# Patient Record
(demographics unavailable — no encounter records)

---

## 2025-06-19 NOTE — REVIEW OF SYSTEMS
[de-identified] : as per HPI  [de-identified] : as per HPI  [de-identified] : as per HPI  [FreeTextEntry2] : as per HPI

## 2025-06-19 NOTE — ADDENDUM
[FreeTextEntry1] :   Documented by Christopher Howard acting as scribe for Dr. Beckman on 06/19/2025. All Medical record entries made by the Scribe were at my, Dr. Beckman, direction and personally dictated by me on 06/19/2025 . I have reviewed the chart and agree that the record accurately reflects my personal performance of the history, physical exam, assessment and plan. I have also personally directed, reviewed, and agreed with the discharge instructions.

## 2025-06-19 NOTE — CONSULT LETTER
[Dear  ___] : Dear  [unfilled], [Courtesy Letter:] : I had the pleasure of seeing your patient, [unfilled], in my office today. [Please see my note below.] : Please see my note below. [Consult Closing:] : Thank you very much for allowing me to participate in the care of this patient.  If you have any questions, please do not hesitate to contact me. [Sincerely,] : Sincerely, [FreeTextEntry2] : Dr. Connie Salvador [id number] [place] [address] [phone] [FreeTextEntry3] : Reynaldo Beckman MD, PhD Chief, Division of Laryngology Department of Otolaryngology [facility] Pediatric Otolaryngology, [facility]  of Otolaryngology [facility]

## 2025-06-19 NOTE — REASON FOR VISIT
[Subsequent Evaluation] : a subsequent evaluation for [Parents] : parents [FreeTextEntry2] : nasal obstruction numerical [id number]

## 2025-06-19 NOTE — HISTORY OF PRESENT ILLNESS
[de-identified] : 5 year old boy with h/o nasal obstruction and snoring, now s/p Adenoidectomy 5/8/24, presents for follow-up. Father reports intermittent nasal congestion ONLY when he is sick. No snoring No throat/tonsil infections.  Eating/drinking without difficulty.  No otalgia, otorrhea, recent fevers or ear infections.  No parental concerns with hearing or speech.